# Patient Record
Sex: MALE | URBAN - METROPOLITAN AREA
[De-identification: names, ages, dates, MRNs, and addresses within clinical notes are randomized per-mention and may not be internally consistent; named-entity substitution may affect disease eponyms.]

---

## 2022-10-04 ENCOUNTER — APPOINTMENT (OUTPATIENT)
Dept: URBAN - METROPOLITAN AREA CLINIC 237 | Age: 62
Setting detail: DERMATOLOGY
End: 2022-10-04

## 2022-10-04 DIAGNOSIS — Z80.8 FAMILY HISTORY OF MALIGNANT NEOPLASM OF OTHER ORGANS OR SYSTEMS: ICD-10-CM

## 2022-10-04 DIAGNOSIS — D18.0 HEMANGIOMA: ICD-10-CM

## 2022-10-04 DIAGNOSIS — L57.0 ACTINIC KERATOSIS: ICD-10-CM

## 2022-10-04 DIAGNOSIS — L90.5 SCAR CONDITIONS AND FIBROSIS OF SKIN: ICD-10-CM

## 2022-10-04 DIAGNOSIS — L81.5 LEUKODERMA, NOT ELSEWHERE CLASSIFIED: ICD-10-CM

## 2022-10-04 DIAGNOSIS — D49.2 NEOPLASM OF UNSPECIFIED BEHAVIOR OF BONE, SOFT TISSUE, AND SKIN: ICD-10-CM

## 2022-10-04 DIAGNOSIS — L57.8 OTHER SKIN CHANGES DUE TO CHRONIC EXPOSURE TO NONIONIZING RADIATION: ICD-10-CM

## 2022-10-04 DIAGNOSIS — D22 MELANOCYTIC NEVI: ICD-10-CM

## 2022-10-04 DIAGNOSIS — L82.1 OTHER SEBORRHEIC KERATOSIS: ICD-10-CM

## 2022-10-04 PROBLEM — D22.5 MELANOCYTIC NEVI OF TRUNK: Status: ACTIVE | Noted: 2022-10-04

## 2022-10-04 PROBLEM — D22.72 MELANOCYTIC NEVI OF LEFT LOWER LIMB, INCLUDING HIP: Status: ACTIVE | Noted: 2022-10-04

## 2022-10-04 PROBLEM — D22.4 MELANOCYTIC NEVI OF SCALP AND NECK: Status: ACTIVE | Noted: 2022-10-04

## 2022-10-04 PROBLEM — D22.71 MELANOCYTIC NEVI OF RIGHT LOWER LIMB, INCLUDING HIP: Status: ACTIVE | Noted: 2022-10-04

## 2022-10-04 PROBLEM — D18.01 HEMANGIOMA OF SKIN AND SUBCUTANEOUS TISSUE: Status: ACTIVE | Noted: 2022-10-04

## 2022-10-04 PROBLEM — D22.61 MELANOCYTIC NEVI OF RIGHT UPPER LIMB, INCLUDING SHOULDER: Status: ACTIVE | Noted: 2022-10-04

## 2022-10-04 PROBLEM — D22.39 MELANOCYTIC NEVI OF OTHER PARTS OF FACE: Status: ACTIVE | Noted: 2022-10-04

## 2022-10-04 PROBLEM — D22.0 MELANOCYTIC NEVI OF LIP: Status: ACTIVE | Noted: 2022-10-04

## 2022-10-04 PROBLEM — D22.62 MELANOCYTIC NEVI OF LEFT UPPER LIMB, INCLUDING SHOULDER: Status: ACTIVE | Noted: 2022-10-04

## 2022-10-04 PROCEDURE — 99203 OFFICE O/P NEW LOW 30 MIN: CPT | Mod: 25

## 2022-10-04 PROCEDURE — OTHER COUNSELING: OTHER

## 2022-10-04 PROCEDURE — OTHER OBSERVATION AND MEASURE: OTHER

## 2022-10-04 PROCEDURE — 17004 DESTROY PREMAL LESIONS 15/>: CPT

## 2022-10-04 PROCEDURE — OTHER MIPS QUALITY: OTHER

## 2022-10-04 PROCEDURE — OTHER OBSERVATION: OTHER

## 2022-10-04 PROCEDURE — OTHER LIQUID NITROGEN: OTHER

## 2022-10-04 PROCEDURE — 11102 TANGNTL BX SKIN SINGLE LES: CPT | Mod: 59

## 2022-10-04 PROCEDURE — OTHER BIOPSY BY SHAVE METHOD: OTHER

## 2022-10-04 PROCEDURE — OTHER DIAGNOSIS COMMENT: OTHER

## 2022-10-04 PROCEDURE — 11103 TANGNTL BX SKIN EA SEP/ADDL: CPT

## 2022-10-04 ASSESSMENT — LOCATION ZONE DERM
LOCATION ZONE: FACE
LOCATION ZONE: LEG
LOCATION ZONE: TRUNK
LOCATION ZONE: SCALP
LOCATION ZONE: ARM
LOCATION ZONE: LIP

## 2022-10-04 ASSESSMENT — LOCATION DETAILED DESCRIPTION DERM
LOCATION DETAILED: PERIUMBILICAL SKIN
LOCATION DETAILED: RIGHT INFERIOR MEDIAL UPPER BACK
LOCATION DETAILED: LEFT ANTERIOR DISTAL THIGH
LOCATION DETAILED: RIGHT ANTERIOR PROXIMAL THIGH
LOCATION DETAILED: LEFT PROXIMAL DORSAL FOREARM
LOCATION DETAILED: RIGHT DISTAL CALF
LOCATION DETAILED: RIGHT CENTRAL PARIETAL SCALP
LOCATION DETAILED: RIGHT INFERIOR MEDIAL FOREHEAD
LOCATION DETAILED: LEFT INFERIOR LATERAL MIDBACK
LOCATION DETAILED: LEFT CENTRAL PARIETAL SCALP
LOCATION DETAILED: LEFT ANTERIOR PROXIMAL UPPER ARM
LOCATION DETAILED: RIGHT DORSAL WRIST
LOCATION DETAILED: RIGHT SUPERIOR MEDIAL UPPER BACK
LOCATION DETAILED: RIGHT MEDIAL UPPER BACK
LOCATION DETAILED: RIGHT PROXIMAL POSTERIOR UPPER ARM
LOCATION DETAILED: LEFT SUPERIOR POSTAURICULAR SKIN
LOCATION DETAILED: LEFT PROXIMAL PRETIBIAL REGION
LOCATION DETAILED: LEFT SUPERIOR PARIETAL SCALP
LOCATION DETAILED: MID-FRONTAL SCALP
LOCATION DETAILED: LEFT DISTAL POSTERIOR THIGH
LOCATION DETAILED: RIGHT POSTERIOR ANKLE
LOCATION DETAILED: LEFT UPPER CUTANEOUS LIP
LOCATION DETAILED: RIGHT LATERAL FOREHEAD
LOCATION DETAILED: RIGHT LATERAL INFERIOR CHEST
LOCATION DETAILED: LEFT CENTRAL FRONTAL SCALP
LOCATION DETAILED: LEFT BUTTOCK
LOCATION DETAILED: LEFT SUPERIOR OCCIPITAL SCALP
LOCATION DETAILED: LEFT PROXIMAL POSTERIOR UPPER ARM
LOCATION DETAILED: LEFT MEDIAL FRONTAL SCALP
LOCATION DETAILED: LEFT MEDIAL MALAR CHEEK
LOCATION DETAILED: LEFT ANTERIOR PROXIMAL THIGH
LOCATION DETAILED: LEFT SUPERIOR LATERAL MIDBACK
LOCATION DETAILED: RIGHT DISTAL DORSAL FOREARM
LOCATION DETAILED: LEFT SUPERIOR MEDIAL MIDBACK
LOCATION DETAILED: LEFT SUPERIOR LATERAL LOWER BACK
LOCATION DETAILED: RIGHT CENTRAL MALAR CHEEK
LOCATION DETAILED: RIGHT POSTERIOR SHOULDER
LOCATION DETAILED: LEFT SUPERIOR MEDIAL FOREHEAD
LOCATION DETAILED: LEFT DISTAL DORSAL FOREARM
LOCATION DETAILED: RIGHT SUPERIOR FOREHEAD
LOCATION DETAILED: RIGHT SUPERIOR PARIETAL SCALP
LOCATION DETAILED: XIPHOID
LOCATION DETAILED: RIGHT PROXIMAL POSTERIOR THIGH
LOCATION DETAILED: LEFT SUPERIOR FOREHEAD
LOCATION DETAILED: RIGHT CENTRAL FRONTAL SCALP
LOCATION DETAILED: EPIGASTRIC SKIN

## 2022-10-04 ASSESSMENT — LOCATION SIMPLE DESCRIPTION DERM
LOCATION SIMPLE: RIGHT SCALP
LOCATION SIMPLE: LEFT FOREHEAD
LOCATION SIMPLE: LEFT UPPER ARM
LOCATION SIMPLE: LEFT THIGH
LOCATION SIMPLE: LEFT LOWER BACK
LOCATION SIMPLE: RIGHT UPPER BACK
LOCATION SIMPLE: LEFT LIP
LOCATION SIMPLE: LEFT OCCIPITAL SCALP
LOCATION SIMPLE: SCALP
LOCATION SIMPLE: RIGHT FOREARM
LOCATION SIMPLE: LEFT FOREARM
LOCATION SIMPLE: LEFT SCALP
LOCATION SIMPLE: RIGHT FOREHEAD
LOCATION SIMPLE: LEFT PRETIBIAL REGION
LOCATION SIMPLE: RIGHT ANKLE
LOCATION SIMPLE: RIGHT CALF
LOCATION SIMPLE: LEFT BUTTOCK
LOCATION SIMPLE: RIGHT SHOULDER
LOCATION SIMPLE: LEFT POSTERIOR THIGH
LOCATION SIMPLE: RIGHT UPPER ARM
LOCATION SIMPLE: ANTERIOR SCALP
LOCATION SIMPLE: RIGHT POSTERIOR THIGH
LOCATION SIMPLE: CHEST
LOCATION SIMPLE: LEFT CHEEK
LOCATION SIMPLE: RIGHT THIGH
LOCATION SIMPLE: RIGHT WRIST
LOCATION SIMPLE: ABDOMEN
LOCATION SIMPLE: RIGHT CHEEK

## 2022-10-04 NOTE — PROCEDURE: LIQUID NITROGEN
Show Aperture Variable?: Yes
Number Of Freeze-Thaw Cycles: 3 freeze-thaw cycles
Render Post-Care Instructions In Note?: no
Application Tool (Optional): Liquid Nitrogen Sprayer
Consent: The patient's consent was obtained including but not limited to risks of crusting, scabbing, blistering, scarring, darker or lighter pigmentary change, recurrence, incomplete removal and infection.
Post-Care Instructions: I reviewed with the patient in detail post-care instructions. Patient is to wear sunprotection, and avoid picking at any of the treated lesions. Pt may apply Vaseline to crusted or scabbing areas.
Duration Of Freeze Thaw-Cycle (Seconds): 5
Detail Level: Simple

## 2022-10-04 NOTE — PROCEDURE: COUNSELING
Detail Level: Zone
Detail Level: Simple
Sunscreen Recommendations: Broad spectrum, SPF 30 or higher
Detail Level: Generalized

## 2022-10-04 NOTE — PROCEDURE: BIOPSY BY SHAVE METHOD
Size Of Lesion In Cm: 0
Consent: Written consent was obtained and risks were reviewed including but not limited to scarring, infection, bleeding, scabbing, incomplete removal, nerve damage and allergy to anesthesia.
Hide Additional Anticipated Plan?: No
Detail Level: Detailed
Notification Instructions: Patient will be notified of biopsy results. However, patient instructed to call the office if not contacted within 2 weeks.
Information: Selecting Yes will display possible errors in your note based on the variables you have selected. This validation is only offered as a suggestion for you. PLEASE NOTE THAT THE VALIDATION TEXT WILL BE REMOVED WHEN YOU FINALIZE YOUR NOTE. IF YOU WANT TO FAX A PRELIMINARY NOTE YOU WILL NEED TO TOGGLE THIS TO 'NO' IF YOU DO NOT WANT IT IN YOUR FAXED NOTE.
Post-Care Instructions: I reviewed with the patient in detail post-care instructions. Patient is to keep the biopsy site dry overnight, and then apply bacitracin twice daily until healed. Patient may apply hydrogen peroxide soaks to remove any crusting.
Electrodesiccation And Curettage Text: The wound bed was treated with electrodesiccation and curettage after the biopsy was performed.
Electrodesiccation Text: The wound bed was treated with electrodesiccation after the biopsy was performed.
Cryotherapy Text: The wound bed was treated with cryotherapy after the biopsy was performed.
Validate Note Data (See Information Below): Yes
Hemostasis: Aluminum Chloride
Curettage Text: The wound bed was treated with curettage after the biopsy was performed.
Billing Type: Third-Party Bill
Depth Of Biopsy: dermis
Biopsy Method: Dermablade
Type Of Destruction Used: Curettage
Anesthesia Volume In Cc (Will Not Render If 0): 0.1
Dressing: Band-Aid
Biopsy Type: H and E
Silver Nitrate Text: The wound bed was treated with silver nitrate after the biopsy was performed.
Wound Care: Aquaphor
Anesthesia Type: 1% lidocaine with epinephrine
Path Notes Override (Will Replace All Of The Above Text): Adjacent seborrheic keratosis taken with shave

## 2022-10-04 NOTE — PROCEDURE: OBSERVATION
Detail Level: Detailed
Size Of Lesion: 4x2mm mbr
Size Of Lesion: 3x1mm mbr
Size Of Lesion: 5x5mm pinkish brown plaque
Size Of Lesion: 7x3mm mbr
Size Of Lesion: 3x3mm mbr macule
Size Of Lesion: 5x5mm lbr macule
Size Of Lesion: 3x3mm fleshy papule
Size Of Lesion: 3x2mm mbr
Size Of Lesion: 5x4mm mbr
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
## 2022-10-04 NOTE — HPI: FULL BODY SKIN EXAMINATION
What Is The Reason For Today's Visit?: Initial Full Body Skin Examination
What Is The Reason For Today's Visit? (Being Monitored For X): the development of new lesions

## 2022-11-01 ENCOUNTER — APPOINTMENT (OUTPATIENT)
Dept: URBAN - METROPOLITAN AREA CLINIC 237 | Age: 62
Setting detail: DERMATOLOGY
End: 2022-11-01

## 2022-11-01 DIAGNOSIS — L90.5 SCAR CONDITIONS AND FIBROSIS OF SKIN: ICD-10-CM

## 2022-11-01 DIAGNOSIS — L57.0 ACTINIC KERATOSIS: ICD-10-CM

## 2022-11-01 DIAGNOSIS — Z87.2 PERSONAL HISTORY OF DISEASES OF THE SKIN AND SUBCUTANEOUS TISSUE: ICD-10-CM

## 2022-11-01 PROCEDURE — OTHER MIPS QUALITY: OTHER

## 2022-11-01 PROCEDURE — OTHER OBSERVATION: OTHER

## 2022-11-01 PROCEDURE — OTHER DIAGNOSIS COMMENT: OTHER

## 2022-11-01 PROCEDURE — 99212 OFFICE O/P EST SF 10 MIN: CPT | Mod: 25

## 2022-11-01 PROCEDURE — OTHER LIQUID NITROGEN: OTHER

## 2022-11-01 PROCEDURE — 17004 DESTROY PREMAL LESIONS 15/>: CPT

## 2022-11-01 PROCEDURE — OTHER COUNSELING: OTHER

## 2022-11-01 ASSESSMENT — LOCATION DETAILED DESCRIPTION DERM
LOCATION DETAILED: LEFT SUPERIOR FOREHEAD
LOCATION DETAILED: RIGHT LATERAL FOREHEAD
LOCATION DETAILED: LEFT CENTRAL ZYGOMA
LOCATION DETAILED: POSTERIOR MID-PARIETAL SCALP
LOCATION DETAILED: RIGHT SUPERIOR MEDIAL FOREHEAD
LOCATION DETAILED: LEFT MEDIAL FRONTAL SCALP
LOCATION DETAILED: MID-FRONTAL SCALP
LOCATION DETAILED: LEFT SUPERIOR HELIX
LOCATION DETAILED: LEFT SUPERIOR FRONTAL SCALP
LOCATION DETAILED: LEFT SUPERIOR PARIETAL SCALP
LOCATION DETAILED: LEFT CENTRAL FRONTAL SCALP
LOCATION DETAILED: RIGHT DISTAL CALF
LOCATION DETAILED: RIGHT SUPERIOR PARIETAL SCALP
LOCATION DETAILED: RIGHT MEDIAL UPPER BACK
LOCATION DETAILED: RIGHT SUPERIOR FRONTAL SCALP
LOCATION DETAILED: RIGHT SUPERIOR HELIX
LOCATION DETAILED: RIGHT CENTRAL FRONTAL SCALP
LOCATION DETAILED: RIGHT MEDIAL FRONTAL SCALP

## 2022-11-01 ASSESSMENT — LOCATION SIMPLE DESCRIPTION DERM
LOCATION SIMPLE: LEFT EAR
LOCATION SIMPLE: RIGHT FOREHEAD
LOCATION SIMPLE: LEFT SCALP
LOCATION SIMPLE: LEFT FOREHEAD
LOCATION SIMPLE: RIGHT CALF
LOCATION SIMPLE: POSTERIOR SCALP
LOCATION SIMPLE: RIGHT SCALP
LOCATION SIMPLE: LEFT ZYGOMA
LOCATION SIMPLE: ANTERIOR SCALP
LOCATION SIMPLE: RIGHT EAR
LOCATION SIMPLE: SCALP
LOCATION SIMPLE: RIGHT UPPER BACK

## 2022-11-01 ASSESSMENT — LOCATION ZONE DERM
LOCATION ZONE: TRUNK
LOCATION ZONE: EAR
LOCATION ZONE: SCALP
LOCATION ZONE: LEG
LOCATION ZONE: FACE

## 2022-11-01 NOTE — PROCEDURE: DIAGNOSIS COMMENT
Detail Level: Simple
Render Risk Assessment In Note?: no
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

## 2022-11-01 NOTE — PROCEDURE: LIQUID NITROGEN
Show Aperture Variable?: Yes
Post-Care Instructions: I reviewed with the patient in detail post-care instructions. Patient is to wear sunprotection, and avoid picking at any of the treated lesions. Pt may apply Vaseline to crusted or scabbing areas.
Number Of Freeze-Thaw Cycles: 3 freeze-thaw cycles
Consent: The patient's consent was obtained including but not limited to risks of crusting, scabbing, blistering, scarring, darker or lighter pigmentary change, recurrence, incomplete removal and infection.
Application Tool (Optional): Liquid Nitrogen Sprayer
Render Post-Care Instructions In Note?: no
Duration Of Freeze Thaw-Cycle (Seconds): 5
Detail Level: Detailed

## 2022-11-01 NOTE — PROCEDURE: OBSERVATION
Detail Level: Detailed
Morphology Per Location (Optional): Bx proven 10/4/22 (site [facility]), colliding with SK; margins narrowly free
X Size Of Lesion In Cm (Optional): 0

## 2022-11-01 NOTE — PROCEDURE: MIPS QUALITY
Quality 111:Pneumonia Vaccination Status For Older Adults: Pneumococcal vaccine (PPSV23) was not administered on or after patient’s 60th birthday and before the end of the measurement period, reason not otherwise specified
Quality 130: Documentation Of Current Medications In The Medical Record: Current Medications Documented
Detail Level: Detailed
Quality 110: Preventive Care And Screening: Influenza Immunization: Influenza Immunization not Administered because Patient Refused.
Quality 226: Preventive Care And Screening: Tobacco Use: Screening And Cessation Intervention: Patient screened for tobacco use and is an ex/non-smoker